# Patient Record
Sex: FEMALE | Race: WHITE | HISPANIC OR LATINO | URBAN - METROPOLITAN AREA
[De-identification: names, ages, dates, MRNs, and addresses within clinical notes are randomized per-mention and may not be internally consistent; named-entity substitution may affect disease eponyms.]

---

## 2024-09-06 PROBLEM — 300332007: Status: ACTIVE | Noted: 2024-09-06

## 2024-09-06 PROBLEM — 309088003: Status: ACTIVE | Noted: 2024-09-06

## 2024-09-10 ENCOUNTER — OFFICE VISIT (OUTPATIENT)
Dept: URBAN - METROPOLITAN AREA CLINIC 68 | Facility: CLINIC | Age: 64
End: 2024-09-10
Payer: MEDICARE

## 2024-09-10 ENCOUNTER — LAB OUTSIDE AN ENCOUNTER (OUTPATIENT)
Dept: URBAN - METROPOLITAN AREA CLINIC 68 | Facility: CLINIC | Age: 64
End: 2024-09-10

## 2024-09-10 VITALS
HEART RATE: 64 BPM | OXYGEN SATURATION: 98 % | HEIGHT: 65 IN | WEIGHT: 161 LBS | SYSTOLIC BLOOD PRESSURE: 118 MMHG | BODY MASS INDEX: 26.82 KG/M2 | DIASTOLIC BLOOD PRESSURE: 76 MMHG

## 2024-09-10 DIAGNOSIS — K82.4 GALLBLADDER POLYP: ICD-10-CM

## 2024-09-10 DIAGNOSIS — K76.9 LIVER LESION: ICD-10-CM

## 2024-09-10 DIAGNOSIS — Z12.11 COLON CANCER SCREENING: ICD-10-CM

## 2024-09-10 PROCEDURE — 99204 OFFICE O/P NEW MOD 45 MIN: CPT | Performed by: INTERNAL MEDICINE

## 2024-09-10 NOTE — HPI-TODAY'S VISIT:
Case of a 64-year-old female patient that comes in today for evaluation.  Patient refers that many years ago she was diagnosed with a liver lesion.  She also has history of a gallbladder polyp.  She denies melena hematochezia hematemesis coffee-ground emesis.  She comes in today for follow-up.
Patient/Caregiver provided printed discharge information.

## 2024-09-17 ENCOUNTER — OFFICE VISIT (OUTPATIENT)
Dept: URBAN - METROPOLITAN AREA CLINIC 67 | Facility: CLINIC | Age: 64
End: 2024-09-17
Payer: MEDICARE

## 2024-09-17 DIAGNOSIS — K82.4 GALLBLADDER POLYP: ICD-10-CM

## 2024-09-17 PROCEDURE — 76705 ECHO EXAM OF ABDOMEN: CPT | Performed by: INTERNAL MEDICINE

## 2024-09-17 PROCEDURE — 93976 VASCULAR STUDY: CPT | Performed by: INTERNAL MEDICINE

## 2024-09-25 ENCOUNTER — LAB OUTSIDE AN ENCOUNTER (OUTPATIENT)
Dept: URBAN - METROPOLITAN AREA CLINIC 68 | Facility: CLINIC | Age: 64
End: 2024-09-25

## 2024-09-26 ENCOUNTER — TELEPHONE ENCOUNTER (OUTPATIENT)
Dept: URBAN - METROPOLITAN AREA CLINIC 68 | Facility: CLINIC | Age: 64
End: 2024-09-26

## 2024-09-27 ENCOUNTER — DASHBOARD ENCOUNTERS (OUTPATIENT)
Age: 64
End: 2024-09-27

## 2024-09-27 ENCOUNTER — OFFICE VISIT (OUTPATIENT)
Dept: URBAN - METROPOLITAN AREA CLINIC 68 | Facility: CLINIC | Age: 64
End: 2024-09-27
Payer: MEDICARE

## 2024-09-27 VITALS
HEART RATE: 66 BPM | HEIGHT: 65 IN | DIASTOLIC BLOOD PRESSURE: 84 MMHG | BODY MASS INDEX: 26.82 KG/M2 | SYSTOLIC BLOOD PRESSURE: 118 MMHG | OXYGEN SATURATION: 98 % | TEMPERATURE: 97.7 F | WEIGHT: 161 LBS

## 2024-09-27 DIAGNOSIS — K82.4 GALLBLADDER POLYP: ICD-10-CM

## 2024-09-27 DIAGNOSIS — Z12.11 COLON CANCER SCREENING: ICD-10-CM

## 2024-09-27 DIAGNOSIS — K76.9 LIVER LESION: ICD-10-CM

## 2024-09-27 PROBLEM — 197433003: Status: ACTIVE | Noted: 2024-09-10

## 2024-09-27 PROBLEM — 300331000: Status: ACTIVE | Noted: 2024-09-10

## 2024-09-27 PROBLEM — 305058001: Status: ACTIVE | Noted: 2024-09-10

## 2024-09-27 PROCEDURE — 99214 OFFICE O/P EST MOD 30 MIN: CPT | Performed by: INTERNAL MEDICINE

## 2024-09-27 NOTE — HPI-TODAY'S VISIT:
Case of a 64-year-old female patient that comes in today for evaluation.  She came in with a concern of her liver lesion.  Furthermore she has history of a gallbladder polyp.  She underwent MRI of the liver lesions were remarkable for a hemangioma.  She comes in today for follow-up.  Denies melena hematochezia hematemesis coffee-ground emesis.